# Patient Record
Sex: MALE | Race: WHITE | NOT HISPANIC OR LATINO | Employment: UNEMPLOYED | URBAN - METROPOLITAN AREA
[De-identification: names, ages, dates, MRNs, and addresses within clinical notes are randomized per-mention and may not be internally consistent; named-entity substitution may affect disease eponyms.]

---

## 2022-08-12 ENCOUNTER — APPOINTMENT (OUTPATIENT)
Dept: RADIOLOGY | Facility: MEDICAL CENTER | Age: 32
End: 2022-08-12
Attending: STUDENT IN AN ORGANIZED HEALTH CARE EDUCATION/TRAINING PROGRAM

## 2022-08-12 ENCOUNTER — HOSPITAL ENCOUNTER (EMERGENCY)
Facility: MEDICAL CENTER | Age: 32
End: 2022-08-12
Attending: STUDENT IN AN ORGANIZED HEALTH CARE EDUCATION/TRAINING PROGRAM
Payer: COMMERCIAL

## 2022-08-12 VITALS
HEIGHT: 65 IN | OXYGEN SATURATION: 96 % | BODY MASS INDEX: 22.04 KG/M2 | HEART RATE: 105 BPM | DIASTOLIC BLOOD PRESSURE: 95 MMHG | TEMPERATURE: 97.8 F | RESPIRATION RATE: 16 BRPM | SYSTOLIC BLOOD PRESSURE: 143 MMHG | WEIGHT: 132.28 LBS

## 2022-08-12 DIAGNOSIS — F29 PSYCHOSIS, UNSPECIFIED PSYCHOSIS TYPE (HCC): ICD-10-CM

## 2022-08-12 DIAGNOSIS — R41.82 ALTERED MENTAL STATUS, UNSPECIFIED ALTERED MENTAL STATUS TYPE: ICD-10-CM

## 2022-08-12 LAB — POC BREATHALIZER: 0 PERCENT (ref 0–0.01)

## 2022-08-12 PROCEDURE — 99283 EMERGENCY DEPT VISIT LOW MDM: CPT

## 2022-08-12 PROCEDURE — 302970 POC BREATHALIZER: Performed by: STUDENT IN AN ORGANIZED HEALTH CARE EDUCATION/TRAINING PROGRAM

## 2022-08-13 ENCOUNTER — HOSPITAL ENCOUNTER (EMERGENCY)
Facility: MEDICAL CENTER | Age: 32
End: 2022-08-13
Attending: EMERGENCY MEDICINE
Payer: COMMERCIAL

## 2022-08-13 VITALS
SYSTOLIC BLOOD PRESSURE: 146 MMHG | WEIGHT: 132.28 LBS | HEART RATE: 72 BPM | TEMPERATURE: 97.5 F | BODY MASS INDEX: 22.04 KG/M2 | DIASTOLIC BLOOD PRESSURE: 77 MMHG | RESPIRATION RATE: 16 BRPM | HEIGHT: 65 IN | OXYGEN SATURATION: 97 %

## 2022-08-13 DIAGNOSIS — F22 PARANOIA (HCC): ICD-10-CM

## 2022-08-13 DIAGNOSIS — F22 DELUSIONS (HCC): ICD-10-CM

## 2022-08-13 PROCEDURE — 99284 EMERGENCY DEPT VISIT MOD MDM: CPT

## 2022-08-13 PROCEDURE — A9270 NON-COVERED ITEM OR SERVICE: HCPCS | Performed by: EMERGENCY MEDICINE

## 2022-08-13 PROCEDURE — 90791 PSYCH DIAGNOSTIC EVALUATION: CPT

## 2022-08-13 PROCEDURE — 700102 HCHG RX REV CODE 250 W/ 637 OVERRIDE(OP): Performed by: EMERGENCY MEDICINE

## 2022-08-13 RX ORDER — HALOPERIDOL 5 MG/1
5 TABLET ORAL ONCE
Status: COMPLETED | OUTPATIENT
Start: 2022-08-13 | End: 2022-08-13

## 2022-08-13 RX ADMIN — HALOPERIDOL 5 MG: 5 TABLET ORAL at 05:13
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
## 2022-08-13 NOTE — ED PROVIDER NOTES
"ED Provider Note    CHIEF COMPLAINT  Chief Complaint   Patient presents with    Psych Eval     Per pt, \"I want the earpiece found, they're watching my every move.\" Denies drinking or doing drugs. Denies medical hx. Denies SI/HI.       HPI  Michael Stevenson is a 32 y.o. male who presents requesting 'XR of head'.  \"I want to know what is in my head.  The police put something in my head.\" \" I want the earpiece found'.   Patient thinking that the government put something in his head and not listening to him and watching him.  He denies any medical complaints today and specifically no headache, neck pain, traumatic injury.  He denies any SI, HI or hallucinations.  He denies any history of schizophrenia or mental illness.  He denies any drinking or drug use.    REVIEW OF SYSTEMS  Denies HA, neck pain, vision changes.    PAST MEDICAL HISTORY  History reviewed. No pertinent past medical history.  Patient denies    SOCIAL HISTORY  Social History     Tobacco Use    Smoking status: Never   Substance Use Topics    Alcohol use: Never    Drug use: Never   Patient denies    SURGICAL HISTORY  History reviewed. No pertinent surgical history.    ALLERGIES  Not on File    PHYSICAL EXAM  VITAL SIGNS: BP (!) 143/95   Pulse (!) 105   Temp 36.6 °C (97.8 °F) (Temporal)   Resp 16   Ht 1.651 m (5' 5\")   Wt 60 kg (132 lb 4.4 oz)   SpO2 96%   BMI 22.01 kg/m²    Constitutional: Awake and alert. Nontoxic appearing  HENT:  Grossly normal.  No signs of trauma   Eyes: Grossly normal  Neck: Normal range of motion  Cardiovascular: Tachycardic heart rate   Thorax & Lungs: No respiratory distress  Abdomen: Nontender  Skin:  No pathologic rash.   Extremities: Well perfused  Psychiatric: Delusional , paranoid      COURSE & MEDICAL DECISION MAKING  This is a young male who presents requesting an x-ray of his head due to concern that the [employer] is tracking him.  He is very cooperative and denies any other acute psychiatric complaints specifically " no suicidal, homicidal ideation or hallucinations.  He denies any medical complaints today.  Given patient has not had imaging that I can find in the system felt appropriate to order CT. However, unfortunately patient eloped before this could be obtained.  I did consider a broad differential for altered mental status including meningitis/encephalitis, intracranial process, electrolyte abnormality, toxic ingestion.  Though, based on my clinical judgment, his presentation seems more consistent with substance induced psychosis and/or primary psychiatric illness, though I cannot find records on this patient to confirm this. Do not see an indication for further lab work or testing today.There was no reason to detain this patient and I feel his complaints could be adequately managed as an outpatient      FINAL IMPRESSION  Altered mental status  Psychosis, unspecified      Disposition: home in good condition      This dictation was created using voice recognition software. The accuracy of the dictation is limited to the abilities of the software.  The nursing notes were reviewed and certain aspects of this information were incorporated into this note.      Electronically signed by: Ludy Anguiano M.D., 8/12/2022 6:50 PM

## 2022-08-13 NOTE — ED NOTES
Pt refusing final set of vital signs. Pt refusing [facility] Recovery assistance. Pt provided with discharge instructions. Pt had no further questions. Pt ambulated to [facility].
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
## 2022-08-13 NOTE — ED TRIAGE NOTES
"Chief Complaint   Patient presents with    Psych Eval     Per pt, \"I want the earpiece found, they're watching my every move.\" Denies drinking or doing drugs. Denies medical hx. Denies SI/HI.     BP (!) 143/95   Pulse (!) 105   Temp 36.6 °C (97.8 °F) (Temporal)   Resp 16   Ht 1.651 m (5' 5\")   Wt 60 kg (132 lb 4.4 oz)   SpO2 96%   BMI 22.01 kg/m²     "

## 2022-08-13 NOTE — ED NOTES
"Pt states he has been here before and last time we kicked him out \"like he was a crazy person\" pt states he is not crazy and he will do a drug test also to \"prove he is not on drugs\". Pt not found in system with info he gave us, states does not have ID  "
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
